# Patient Record
(demographics unavailable — no encounter records)

---

## 2024-11-04 NOTE — HISTORY OF PRESENT ILLNESS
[FreeTextEntry1] : Patient presents for skin examination. [de-identified] : Denies new, changing, bleeding or tender lesions on the skin over the past year.

## 2024-11-04 NOTE — PHYSICAL EXAM

## 2024-12-12 NOTE — HEALTH RISK ASSESSMENT
[Yes] : Yes [4 or more  times a week (4 pts)] : 4 or more  times a week (4 points) [1 or 2 (0 pts)] : 1 or 2 (0 points) [Never (0 pts)] : Never (0 points) [No falls in past year] : Patient reported no falls in the past year [0] : 2) Feeling down, depressed, or hopeless: Not at all (0) [Never] : Never [NO] : No [Patient reported PAP Smear was normal] : Patient reported PAP Smear was normal [Patient reported bone density results were normal] : Patient reported bone density results were normal [Patient reported colonoscopy was normal] : Patient reported colonoscopy was normal [None] : None [] :  [Fully functional (bathing, dressing, toileting, transferring, walking, feeding)] : Fully functional (bathing, dressing, toileting, transferring, walking, feeding) [Fully functional (using the telephone, shopping, preparing meals, housekeeping, doing laundry, using] : Fully functional and needs no help or supervision to perform IADLs (using the telephone, shopping, preparing meals, housekeeping, doing laundry, using transportation, managing medications and managing finances) [Smoke Detector] : smoke detector [Carbon Monoxide Detector] : carbon monoxide detector [Seat Belt] :  uses seat belt [Sunscreen] : uses sunscreen [de-identified] : walking [de-identified] : reg [[id number]] : 0 [EyeExamDate] : 1/1/24 [Change in mental status noted] : No change in mental status noted [Reports changes in hearing] : Reports no changes in hearing [MammogramComments] : double mastectomy [BoneDensityDate] : 2/16/23 [ColonoscopyDate] : 2/14/23 [AdvancecareDate] : 12/12/24

## 2024-12-12 NOTE — ASSESSMENT
[FreeTextEntry1] : All preventative measures were reviewed with the patient and the patient is due for and agrees to the following as outlined  in the plan  below. '

## 2024-12-12 NOTE — PHYSICAL EXAM
[No Acute Distress] : no acute distress [Well Nourished] : well nourished [Well Developed] : well developed [Well-Appearing] : well-appearing [Normal Sclera/Conjunctiva] : normal sclera/conjunctiva [PERRL] : pupils equal round and reactive to light [EOMI] : extraocular movements intact [Normal Outer Ear/Nose] : the outer ears and nose were normal in appearance [Normal Oropharynx] : the oropharynx was normal [No JVD] : no jugular venous distention [No Lymphadenopathy] : no lymphadenopathy [Supple] : supple [Thyroid Normal, No Nodules] : the thyroid was normal and there were no nodules present [No Respiratory Distress] : no respiratory distress  [No Accessory Muscle Use] : no accessory muscle use [Clear to Auscultation] : lungs were clear to auscultation bilaterally [Normal Rate] : normal rate  [Regular Rhythm] : with a regular rhythm [Normal S1, S2] : normal S1 and S2 [No Murmur] : no murmur heard [No Carotid Bruits] : no carotid bruits [No Abdominal Bruit] : a ~M bruit was not heard ~T in the abdomen [No Varicosities] : no varicosities [Pedal Pulses Present] : the pedal pulses are present [No Edema] : there was no peripheral edema [No Palpable Aorta] : no palpable aorta [No Extremity Clubbing/Cyanosis] : no extremity clubbing/cyanosis [Soft] : abdomen soft [Non Tender] : non-tender [Non-distended] : non-distended [No HSM] : no HSM [Normal Bowel Sounds] : normal bowel sounds [Normal Posterior Cervical Nodes] : no posterior cervical lymphadenopathy [Normal Anterior Cervical Nodes] : no anterior cervical lymphadenopathy [No CVA Tenderness] : no CVA  tenderness [No Spinal Tenderness] : no spinal tenderness [No Joint Swelling] : no joint swelling [Grossly Normal Strength/Tone] : grossly normal strength/tone [No Rash] : no rash [Coordination Grossly Intact] : coordination grossly intact [No Focal Deficits] : no focal deficits [Normal Gait] : normal gait [Deep Tendon Reflexes (DTR)] : deep tendon reflexes were 2+ and symmetric [Normal Affect] : the affect was normal [Normal Insight/Judgement] : insight and judgment were intact [No Masses] : no palpable masses [No Nipple Discharge] : no nipple discharge [No Axillary Lymphadenopathy] : no axillary lymphadenopathy [FreeTextEntry1] : deferred to gyn

## 2024-12-24 NOTE — ASSESSMENT
[FreeTextEntry1] : Achalasia: The patient is having symptoms currently.  She will be scheduled for EGD with Botox at [facility].  At this time, I had extensive discussion with her for consideration for esophageal POEM or alternative with Heller myotomy.  She will consider that after this EGD.  He is medically optimized. Mello Middleton MD Gastroenterology

## 2024-12-24 NOTE — PHYSICAL EXAM

## 2024-12-24 NOTE — HISTORY OF PRESENT ILLNESS
[FreeTextEntry1] : 63-year-old woman with history of achalasia and multiple EGDs with Botox to GE junction. PMH of breast cancer s/p b/l nipple sparing mastectomy and reconstruction in 2013 and needed adjuvant chemotherapy x 4 cycles in 2013, along with a h/o achalasia, HLD, hypothyroidism She typically tries to stretch it for a full year before EGDs, if possible but noticed progressive dysphagia with solids over the last few months.    She is up to date with colon cancer screening, most recent colonoscopy was in 2/2023 with stanton Hough. Advised to repeat in 10 years.  Last EGD was in August 2023. [de-identified] :   [address] [facility] [address]    [place] [address]    EGD Report    Date: 2023    Patient Name: MOSHE RACHEL    MRN: [id number]    Account Number:    [id number]    Gender: Female     (age): 1961 (62)    Instrument(s):    GIF H190 ([id number])([id number])    Attending/Fellow:    Mello Middleton MD        Procedure Room #:    PROCEDURE ROOM 3    Referring Physician:    DORON PEARSON    [address] [place] [address]    [phone] (phone)    [phone] (fax)        History of Present Illness:    The patient is seen for EGD evaluation of history of achalasia.    Administered Medications:    As per Anesthesiology Record    Indications:    Achalasia: 530.0 - K22.0    Procedure:    The procedure, indications, preparation and potential complications were  explained to the patient, who indicated understanding and signed the  corresponding consent forms. MAC was administered by anesthesiologist.  Continuous pulse oximetry and blood pressure monitoring were used throughout the  procedure. Supplemental oxygen was used. Patient was placed in the left lateral  decubitus position. The endoscope was introduced through the mouth and advanced  under direct visualization until the second part of the duodenum was reached.  Patient tolerance to the procedure was good. The procedure was not difficult.  Blood loss was minimal.    Limitations/Complications:    There were no apparent limitations or complications    Findings:    Esophagus Mucosa Normal mucosa was noted in the whole esophagus. Mild narrowing  of distal esophageal area was noted suggestive of achalasia. GE junction was  noted at 40 cm. 4 quadrant botulinum toxin was injected. Total of 100 units were  injected.    Stomach Mucosa Normal mucosa was noted in the whole stomach.    Duodenum Mucosa Normal mucosa was noted in the whole examined duodenum.    Impressions:    Normal mucosa in the gastroesophageal junction.    Normal mucosa in the whole stomach.    Normal mucosa in the whole examined duodenum.    Plan:    Discharge to home    Patient will call office if any worrisome complaints, anticipatory guidance  discussed        Mello Middleton MD    Version 1, Electronically signed on 2023 12:24:26 PM by Mello Middleton MD

## 2024-12-24 NOTE — HISTORY OF PRESENT ILLNESS
[FreeTextEntry1] : 63-year-old woman with history of achalasia and multiple EGDs with Botox to GE junction. PMH of breast cancer s/p b/l nipple sparing mastectomy and reconstruction in 2013 and needed adjuvant chemotherapy x 4 cycles in 2013, along with a h/o achalasia, HLD, hypothyroidism She typically tries to stretch it for a full year before EGDs, if possible but noticed progressive dysphagia with solids over the last few months.    She is up to date with colon cancer screening, most recent colonoscopy was in 2/2023 with stanton Hough. Advised to repeat in 10 years.  Last EGD was in August 2023. [de-identified] :   [address] [facility] [address]    [place] [address]    EGD Report    Date: 2023    Patient Name: MOSHE RACHEL    MRN: [id number]    Account Number:    [id number]    Gender: Female     (age): 1961 (62)    Instrument(s):    GIF H190 ([id number])([id number])    Attending/Fellow:    Mello Middleton MD        Procedure Room #:    PROCEDURE ROOM 3    Referring Physician:    DORON PEARSON    [address] [place] [address]    [phone] (phone)    [phone] (fax)        History of Present Illness:    The patient is seen for EGD evaluation of history of achalasia.    Administered Medications:    As per Anesthesiology Record    Indications:    Achalasia: 530.0 - K22.0    Procedure:    The procedure, indications, preparation and potential complications were  explained to the patient, who indicated understanding and signed the  corresponding consent forms. MAC was administered by anesthesiologist.  Continuous pulse oximetry and blood pressure monitoring were used throughout the  procedure. Supplemental oxygen was used. Patient was placed in the left lateral  decubitus position. The endoscope was introduced through the mouth and advanced  under direct visualization until the second part of the duodenum was reached.  Patient tolerance to the procedure was good. The procedure was not difficult.  Blood loss was minimal.    Limitations/Complications:    There were no apparent limitations or complications    Findings:    Esophagus Mucosa Normal mucosa was noted in the whole esophagus. Mild narrowing  of distal esophageal area was noted suggestive of achalasia. GE junction was  noted at 40 cm. 4 quadrant botulinum toxin was injected. Total of 100 units were  injected.    Stomach Mucosa Normal mucosa was noted in the whole stomach.    Duodenum Mucosa Normal mucosa was noted in the whole examined duodenum.    Impressions:    Normal mucosa in the gastroesophageal junction.    Normal mucosa in the whole stomach.    Normal mucosa in the whole examined duodenum.    Plan:    Discharge to home    Patient will call office if any worrisome complaints, anticipatory guidance  discussed        Mello Middleton MD    Version 1, Electronically signed on 2023 12:24:26 PM by Mello Middleton MD

## 2024-12-24 NOTE — ASSESSMENT
[FreeTextEntry1] : Achalasia: The patient is having symptoms currently.  She will be scheduled for EGD with Botox at [facility].  At this time, I had extensive discussion with her for consideration for esophageal POEM or alternative with Heller myotomy.  She will consider that after this EGD.  He is medically optimized. Mello Middleton MD Gastroenterology

## 2024-12-29 NOTE — PHYSICAL EXAM
[Alert] : alert [Normal Voice/Communication] : normal voice/communication [Healthy Appearing] : healthy appearing [No Acute Distress] : no acute distress [Sclera] : the sclera and conjunctiva were normal [Hearing Threshold Finger Rub Not [place]] : hearing was normal [Normal Lips/Gums] : the lips and gums were normal [Oropharynx] : the oropharynx was normal [Normal Appearance] : the appearance of the neck was normal [No Neck Mass] : no neck mass was observed [No Respiratory Distress] : no respiratory distress [No Acc Muscle Use] : no accessory muscle use [Respiration, Rhythm And Depth] : normal respiratory rhythm and effort [Auscultation Breath Sounds / Voice Sounds] : lungs were clear to auscultation bilaterally [Heart Rate And Rhythm] : heart rate was normal and rhythm regular [Normal S1, S2] : normal S1 and S2 [Murmurs] : no murmurs [Bowel Sounds] : normal bowel sounds [Abdomen Tenderness] : non-tender [No Masses] : no abdominal mass palpated [Abdomen Soft] : soft [] : no hepatosplenomegaly [Oriented To Time, Place, And Person] : oriented to person, place, and time

## 2024-12-29 NOTE — PHYSICAL EXAM
[Alert] : alert [Normal Voice/Communication] : normal voice/communication [Healthy Appearing] : healthy appearing [No Acute Distress] : no acute distress [Sclera] : the sclera and conjunctiva were normal [Hearing Threshold Finger Rub Not [place]] : hearing was normal [Normal Lips/Gums] : the lips and gums were normal [Oropharynx] : the oropharynx was normal [Normal Appearance] : the appearance of the neck was normal [No Neck Mass] : no neck mass was observed [No Respiratory Distress] : no respiratory distress [No Acc Muscle Use] : no accessory muscle use [Respiration, Rhythm And Depth] : normal respiratory rhythm and effort [Auscultation Breath Sounds / Voice Sounds] : lungs were clear to auscultation bilaterally [Heart Rate And Rhythm] : heart rate was normal and rhythm regular [Normal S1, S2] : normal S1 and S2 [Murmurs] : no murmurs [Bowel Sounds] : normal bowel sounds [Abdomen Tenderness] : non-tender [No Masses] : no abdominal mass palpated [Abdomen Soft] : soft [] : no hepatosplenomegaly [Oriented To Time, Place, And Person] : oriented to person, place, and time

## 2024-12-30 NOTE — HISTORY OF PRESENT ILLNESS
[FreeTextEntry1] : 63-year-old woman with history of achalasia and multiple EGDs with Botox to GE junction. PMH of breast cancer s/p b/l nipple sparing mastectomy and reconstruction in 2013 and needed adjuvant chemotherapy x 4 cycles in 2013, along with a h/o achalasia, HLD, hypothyroidism She typically tries to stretch it for a full year before EGDs, if possible but noticed progressive dysphagia with solids over the last few months.    She is up to date with colon cancer screening, most recent colonoscopy was in 2/2023 with stanton Hough. Advised to repeat in 10 years.  Last EGD was in August 2023.

## 2025-03-04 NOTE — PHYSICAL EXAM
[Fully active, able to carry on all pre-disease performance without restriction] : Status 0 - Fully active, able to carry on all pre-disease performance without restriction [Normal] : affect appropriate [de-identified] : Bilateral nipple sparing mastectomies with silicone implants. No palpable masses b/l, no chest wall changes, no skin or nipple changes noted; no axillary adenopathy

## 2025-03-04 NOTE — HISTORY OF PRESENT ILLNESS
[Disease: _____________________] : Disease: [unfilled] [T: ___] : T[unfilled] [N: ___] : N[unfilled] [M: ___] : M[unfilled] [AJCC Stage: ____] : AJCC Stage: [unfilled] [de-identified] : The patient presented in 2013, at age 52, with an abnormal mammogram.  Ultimately Dr.Erna Zhanna Clifford performed bilateral nipple sparing mastectomies right axillary lymph node dissection and left sentinel lymph node biopsy followed by immediate placement of saline expanders by Dr. Triston Claire. Pathology from the right breast demonstrated infiltrating lobular carcinoma measuring at least 2.5 cm. The cancer was estrogen receptor positive (regular 90%), progesterone receptor positive (greater than 90%) and HER-2/jose negative (1+).  3/19 axillary lymph nodes were positive for metastatic disease. The infiltrating carcinoma extended to within a fraction of a millimeter of the anterior margin.  The left breast was free of neoplasm.  Postoperatively the patient received adjuvant chemotherapy. 4 cycles of AC were administered from July 1, 2013 through August 4, 2013. She received  a cumulative dose of 340 mg per meter squared of Adriamycin. This was followed by 4 cycles of dose dense Taxol from August 26, 2013 through October 7, 2013.  The patient subsequently received radiation therapy from Dr. Nathaly Cruz.  She started adjuvant endocrine therapy with anastrozole on January 10, 2014, which she continues to the present time. The patient consented to participate in ECOG study E1Z11 (AIMSS), which is evaluating patients for the development of musculoskeletal skeletal symptoms on aromatase inhibitors. [de-identified] : infiltrating lobular carcinoma, ER+ 90%, NE+ >90%, Her2 jose - [Treatment Protocol] : Treatment Protocol [Therapy: ___] : Therapy: [unfilled] [FreeTextEntry1] :  Anastrozole start 1/10/2014\par  Reclast start 4/8/2016 [de-identified] : 3/4/25 Patient presents today to rule out metastatic breast cancer On anastrozole since 1/10/2014 - 1/2024 - now completed Patient denies any SOB, CP, abdominal pain, bone pain, headache, or unexplained weight loss Patient denies any breast masses,  skin changes or nipple discharge. Patient denies any hotflashes, arthralgias, vaginal dryness, vaginal bleeding, hair loss, muscle cramps.  Last saw breast surgeon Dr Zhanna Clifford 9/2024 , seeing yearly.  Denies chest wall masses Labs reviewed from 12/2024  - stable  HEALTH MAINTENANCE PCP Dr Tonio Hernandez follows yearly Had eye exam 6/2020, no cataracts.  Last colonoscopy 2/14/23. No colon polyps; normal mucosa; next due in 10 years.  Most recent visit to GI Dr Marin Ashford 6/2023;  has yearly endoscopy for Botox procedure for achalasia; last 8/11/23 Last  GYN exam in 2023. Denies  vaginal spotting/bleeding.  DEXA with osteopenia s/p tx with Reclast x 1 in 4/2016, had a syncopal episode 23hrs after the injection, no further tx. BMD 9/2020 normal. Last saw endocrinologist Dr Fabi Flynn 9/8/20, exam stable;  BMD 2/2023 Osteopenia with T score -1.2; will need repeat in 2025 ENDO hypothyroidism on synthroid and dose was recently adjusted dermatology evaluation with Dr Mcbride , no evidence of skin cancer 11/2020 Active and walking 2 miles every day.   COVID vaccine 3/26/2021 2nd dose, left arm., COVID booster 11/2/21 left arm [100: Normal, no complaints, no evidence of disease.] : 100: Normal, no complaints, no evidence of disease.

## 2025-03-04 NOTE — ASSESSMENT
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 Chao annually (in 9/2025) - did not have genetic testing done initially; has declined since -recent labs reviewed from 12/2024 normal chem panel;  -BMD - last in 2/2023 osteopenia with T score -1.2; repeat in 2/2025; will order - Patient had the opportunity to have all their questions answered to their satisfaction - f/u in 12 months